# Patient Record
Sex: MALE | Race: BLACK OR AFRICAN AMERICAN | NOT HISPANIC OR LATINO | URBAN - METROPOLITAN AREA
[De-identification: names, ages, dates, MRNs, and addresses within clinical notes are randomized per-mention and may not be internally consistent; named-entity substitution may affect disease eponyms.]

---

## 2020-12-31 ENCOUNTER — WEB ENCOUNTER (OUTPATIENT)
Dept: URBAN - METROPOLITAN AREA CLINIC 115 | Facility: CLINIC | Age: 40
End: 2020-12-31

## 2021-01-07 ENCOUNTER — OFFICE VISIT (OUTPATIENT)
Dept: URBAN - METROPOLITAN AREA CLINIC 82 | Facility: CLINIC | Age: 41
End: 2021-01-07

## 2021-02-04 ENCOUNTER — OFFICE VISIT (OUTPATIENT)
Dept: URBAN - METROPOLITAN AREA CLINIC 82 | Facility: CLINIC | Age: 41
End: 2021-02-04
Payer: COMMERCIAL

## 2021-02-04 DIAGNOSIS — B18.1 CHRONIC VIRAL HEPATITIS B WITHOUT DELTA AGENT AND WITHOUT COMA: ICD-10-CM

## 2021-02-04 PROCEDURE — G8483 FLU IMM NO ADMIN DOC REA: HCPCS | Performed by: INTERNAL MEDICINE

## 2021-02-04 PROCEDURE — G8420 CALC BMI NORM PARAMETERS: HCPCS | Performed by: INTERNAL MEDICINE

## 2021-02-04 PROCEDURE — 99213 OFFICE O/P EST LOW 20 MIN: CPT | Performed by: INTERNAL MEDICINE

## 2021-02-04 RX ORDER — LISINOPRIL 10 MG/1
1 TABLET TABLET ORAL ONCE A DAY
Status: ACTIVE | COMMUNITY

## 2021-02-04 NOTE — HPI-TODAY'S VISIT:
The patient is a 41 yo male following up for hepatitis B.  Diagnosed 4 years ago and had some work-up done including labs and US with Dr. Gitlin.  Has not followed up since 2017.  No previous medications. Currently asymptomatic.  Doing well.  No family hx of hcc.

## 2021-02-10 LAB
A/G RATIO: 2
AFP, SERUM, TUMOR MARKER: 3.4
ALBUMIN: 4.7
ALKALINE PHOSPHATASE: 91
ALT (SGPT): 22
AST (SGOT): 17
BILIRUBIN, TOTAL: <0.2
BUN/CREATININE RATIO: 14
BUN: 16
CALCIUM: 9.9
CARBON DIOXIDE, TOTAL: 29
CHLORIDE: 99
CREATININE: 1.17
EGFR IF AFRICN AM: 90
EGFR IF NONAFRICN AM: 78
GLOBULIN, TOTAL: 2.4
GLUCOSE: 120
HBV LOG10: 1.78
HEPATITIS B QUANTITATION: 60
POTASSIUM: 3.7
PROTEIN, TOTAL: 7.1
SODIUM: 142
TEST INFORMATION:: (no result)

## 2021-02-16 ENCOUNTER — TELEPHONE ENCOUNTER (OUTPATIENT)
Dept: URBAN - METROPOLITAN AREA CLINIC 92 | Facility: CLINIC | Age: 41
End: 2021-02-16

## 2021-03-05 ENCOUNTER — OFFICE VISIT (OUTPATIENT)
Dept: URBAN - METROPOLITAN AREA CLINIC 81 | Facility: CLINIC | Age: 41
End: 2021-03-05

## 2021-08-05 ENCOUNTER — OFFICE VISIT (OUTPATIENT)
Dept: URBAN - METROPOLITAN AREA CLINIC 82 | Facility: CLINIC | Age: 41
End: 2021-08-05
Payer: COMMERCIAL

## 2021-08-05 ENCOUNTER — WEB ENCOUNTER (OUTPATIENT)
Dept: URBAN - METROPOLITAN AREA CLINIC 82 | Facility: CLINIC | Age: 41
End: 2021-08-05

## 2021-08-05 DIAGNOSIS — B18.1 CHRONIC VIRAL HEPATITIS B WITHOUT DELTA AGENT AND WITHOUT COMA: ICD-10-CM

## 2021-08-05 PROCEDURE — 99213 OFFICE O/P EST LOW 20 MIN: CPT | Performed by: INTERNAL MEDICINE

## 2021-08-05 RX ORDER — LISINOPRIL 10 MG/1
1 TABLET TABLET ORAL ONCE A DAY
Status: ACTIVE | COMMUNITY

## 2021-08-05 NOTE — HPI-TODAY'S VISIT:
The patient is a 39 yo male with hepatitis B following up.  Currently doing well and not taking medications.  Previous labs showed low viral load and normal liver enzymes.

## 2022-06-30 ENCOUNTER — OFFICE VISIT (OUTPATIENT)
Dept: URBAN - METROPOLITAN AREA CLINIC 82 | Facility: CLINIC | Age: 42
End: 2022-06-30
Payer: COMMERCIAL

## 2022-06-30 VITALS
DIASTOLIC BLOOD PRESSURE: 81 MMHG | TEMPERATURE: 98.4 F | WEIGHT: 178.2 LBS | SYSTOLIC BLOOD PRESSURE: 121 MMHG | HEIGHT: 66 IN | HEART RATE: 87 BPM | BODY MASS INDEX: 28.64 KG/M2

## 2022-06-30 DIAGNOSIS — B18.1 CHRONIC VIRAL HEPATITIS B WITHOUT DELTA AGENT AND WITHOUT COMA: ICD-10-CM

## 2022-06-30 PROCEDURE — 99213 OFFICE O/P EST LOW 20 MIN: CPT | Performed by: INTERNAL MEDICINE

## 2022-06-30 RX ORDER — LISINOPRIL 10 MG/1
1 TABLET TABLET ORAL ONCE A DAY
Status: ACTIVE | COMMUNITY

## 2022-06-30 NOTE — HPI-TODAY'S VISIT:
The patient is a 41 yo male with hepatitis B following up.  Currently doing well and not taking medications.  Previous labs showed low viral load and normal liver enzymes.

## 2022-07-07 ENCOUNTER — OFFICE VISIT (OUTPATIENT)
Dept: URBAN - METROPOLITAN AREA CLINIC 114 | Facility: CLINIC | Age: 42
End: 2022-07-07
Payer: COMMERCIAL

## 2022-07-07 DIAGNOSIS — B18.1 CHRONIC HEPATITIS B: ICD-10-CM

## 2022-07-07 PROCEDURE — 76705 ECHO EXAM OF ABDOMEN: CPT | Performed by: INTERNAL MEDICINE

## 2022-07-07 RX ORDER — LISINOPRIL 10 MG/1
1 TABLET TABLET ORAL ONCE A DAY
Status: ACTIVE | COMMUNITY

## 2022-07-11 PROBLEM — 61977001 CHRONIC TYPE B VIRAL HEPATITIS: Status: ACTIVE | Noted: 2022-07-11

## 2022-07-12 LAB
A/G RATIO: 2
AFP, SERUM, TUMOR MARKER: 3.1
ALBUMIN: 4.3
ALKALINE PHOSPHATASE: 77
ALT (SGPT): 13
AST (SGOT): 10
BILIRUBIN, TOTAL: 0.2
BUN/CREATININE RATIO: 9
BUN: 10
CALCIUM: 9.3
CARBON DIOXIDE, TOTAL: 23
CHLORIDE: 106
CREATININE: 1.17
EGFR: 80
GLOBULIN, TOTAL: 2.2
GLUCOSE: 85
HBV LOG10: 1.78
HEPATITIS B QUANTITATION: 60
POTASSIUM: 4.1
PROTEIN, TOTAL: 6.5
SODIUM: 140
TEST INFORMATION:: (no result)

## 2022-07-13 ENCOUNTER — TELEPHONE ENCOUNTER (OUTPATIENT)
Dept: URBAN - METROPOLITAN AREA CLINIC 92 | Facility: CLINIC | Age: 42
End: 2022-07-13

## 2022-07-15 ENCOUNTER — TELEPHONE ENCOUNTER (OUTPATIENT)
Dept: URBAN - METROPOLITAN AREA CLINIC 92 | Facility: CLINIC | Age: 42
End: 2022-07-15

## 2022-12-29 ENCOUNTER — OFFICE VISIT (OUTPATIENT)
Dept: URBAN - METROPOLITAN AREA CLINIC 82 | Facility: CLINIC | Age: 42
End: 2022-12-29

## 2023-03-02 ENCOUNTER — OFFICE VISIT (OUTPATIENT)
Dept: URBAN - METROPOLITAN AREA CLINIC 82 | Facility: CLINIC | Age: 43
End: 2023-03-02
Payer: COMMERCIAL

## 2023-03-02 VITALS
TEMPERATURE: 98.9 F | BODY MASS INDEX: 28.87 KG/M2 | HEIGHT: 66 IN | RESPIRATION RATE: 16 BRPM | HEART RATE: 65 BPM | SYSTOLIC BLOOD PRESSURE: 134 MMHG | DIASTOLIC BLOOD PRESSURE: 82 MMHG | WEIGHT: 179.6 LBS

## 2023-03-02 DIAGNOSIS — B18.1 CHRONIC VIRAL HEPATITIS B WITHOUT DELTA AGENT AND WITHOUT COMA: ICD-10-CM

## 2023-03-02 PROBLEM — 61977001: Status: ACTIVE | Noted: 2021-02-04

## 2023-03-02 PROCEDURE — 99213 OFFICE O/P EST LOW 20 MIN: CPT | Performed by: INTERNAL MEDICINE

## 2023-03-02 RX ORDER — LISINOPRIL 10 MG/1
1 TABLET TABLET ORAL ONCE A DAY
Status: ACTIVE | COMMUNITY

## 2023-03-06 LAB
A/G RATIO: 1.7
AFP, SERUM, TUMOR MARKER: 3.4
ALBUMIN: 4.4
ALKALINE PHOSPHATASE: 69
ALT (SGPT): 14
AST (SGOT): 12
BILIRUBIN, TOTAL: 0.4
BUN/CREATININE RATIO: (no result)
BUN: 17
CALCIUM: 10.2
CARBON DIOXIDE, TOTAL: 29
CHLORIDE: 102
CREATININE: 1.1
EGFR: 86
GLOBULIN, TOTAL: 2.6
GLUCOSE: 91
HEPATITIS B VIRUS DNA: 1.94
HEPATITIS B VIRUS DNA: 87
POTASSIUM: 3.9
PROTEIN, TOTAL: 7
SODIUM: 140

## 2023-03-07 ENCOUNTER — TELEPHONE ENCOUNTER (OUTPATIENT)
Dept: URBAN - METROPOLITAN AREA CLINIC 92 | Facility: CLINIC | Age: 43
End: 2023-03-07

## 2023-09-07 ENCOUNTER — OFFICE VISIT (OUTPATIENT)
Dept: URBAN - METROPOLITAN AREA CLINIC 82 | Facility: CLINIC | Age: 43
End: 2023-09-07
Payer: COMMERCIAL

## 2023-09-07 VITALS
WEIGHT: 180 LBS | DIASTOLIC BLOOD PRESSURE: 86 MMHG | BODY MASS INDEX: 28.93 KG/M2 | HEART RATE: 89 BPM | RESPIRATION RATE: 20 BRPM | HEIGHT: 66 IN | SYSTOLIC BLOOD PRESSURE: 143 MMHG | TEMPERATURE: 98.2 F

## 2023-09-07 DIAGNOSIS — B18.1 CHRONIC VIRAL HEPATITIS B WITHOUT DELTA AGENT AND WITHOUT COMA: ICD-10-CM

## 2023-09-07 PROCEDURE — 99213 OFFICE O/P EST LOW 20 MIN: CPT | Performed by: INTERNAL MEDICINE

## 2023-09-07 RX ORDER — LISINOPRIL 10 MG/1
1 TABLET TABLET ORAL ONCE A DAY
Status: ACTIVE | COMMUNITY

## 2023-09-09 LAB
A/G RATIO: 1.7
AFP, SERUM, TUMOR MARKER: 3.3
ALBUMIN: 4.3
ALKALINE PHOSPHATASE: 69
ALT (SGPT): 14
AST (SGOT): 15
BILIRUBIN, TOTAL: 0.4
BUN/CREATININE RATIO: (no result)
BUN: 10
CALCIUM: 9.8
CARBON DIOXIDE, TOTAL: 27
CHLORIDE: 106
CREATININE: 1.15
EGFR: 81
GLOBULIN, TOTAL: 2.5
GLUCOSE: 139
HBV LOG10: 1.79
HEPATITIS B VIRUS DNA: 1.79
HEPATITIS B VIRUS DNA: 61
POTASSIUM: 3.9
PROTEIN, TOTAL: 6.8
SODIUM: 139

## 2023-09-11 ENCOUNTER — TELEPHONE ENCOUNTER (OUTPATIENT)
Dept: URBAN - METROPOLITAN AREA CLINIC 42 | Facility: CLINIC | Age: 43
End: 2023-09-11

## 2023-10-13 ENCOUNTER — OFFICE VISIT (OUTPATIENT)
Dept: URBAN - METROPOLITAN AREA CLINIC 81 | Facility: CLINIC | Age: 43
End: 2023-10-13

## 2023-12-04 ENCOUNTER — OFFICE VISIT (OUTPATIENT)
Dept: URBAN - METROPOLITAN AREA CLINIC 114 | Facility: CLINIC | Age: 43
End: 2023-12-04

## 2023-12-07 ENCOUNTER — OFFICE VISIT (OUTPATIENT)
Dept: URBAN - METROPOLITAN AREA CLINIC 114 | Facility: CLINIC | Age: 43
End: 2023-12-07
Payer: COMMERCIAL

## 2023-12-07 DIAGNOSIS — B19.10 HEPATITIS B: ICD-10-CM

## 2023-12-07 PROCEDURE — 76705 ECHO EXAM OF ABDOMEN: CPT | Performed by: INTERNAL MEDICINE

## 2023-12-07 RX ORDER — LISINOPRIL 10 MG/1
1 TABLET TABLET ORAL ONCE A DAY
Status: ACTIVE | COMMUNITY

## 2023-12-08 ENCOUNTER — TELEPHONE ENCOUNTER (OUTPATIENT)
Dept: URBAN - METROPOLITAN AREA CLINIC 42 | Facility: CLINIC | Age: 43
End: 2023-12-08

## 2024-01-17 ENCOUNTER — TELEPHONE ENCOUNTER (OUTPATIENT)
Dept: URBAN - METROPOLITAN AREA CLINIC 115 | Facility: CLINIC | Age: 44
End: 2024-01-17

## 2024-04-11 ENCOUNTER — OV EP (OUTPATIENT)
Dept: URBAN - METROPOLITAN AREA CLINIC 82 | Facility: CLINIC | Age: 44
End: 2024-04-11
Payer: COMMERCIAL

## 2024-04-11 VITALS
WEIGHT: 177 LBS | HEART RATE: 86 BPM | TEMPERATURE: 98.2 F | RESPIRATION RATE: 20 BRPM | BODY MASS INDEX: 28.45 KG/M2 | HEIGHT: 66 IN | SYSTOLIC BLOOD PRESSURE: 146 MMHG | DIASTOLIC BLOOD PRESSURE: 104 MMHG

## 2024-04-11 DIAGNOSIS — B18.1 CHRONIC VIRAL HEPATITIS B WITHOUT DELTA AGENT AND WITHOUT COMA: ICD-10-CM

## 2024-04-11 PROCEDURE — 99213 OFFICE O/P EST LOW 20 MIN: CPT | Performed by: INTERNAL MEDICINE

## 2024-04-11 RX ORDER — LISINOPRIL 10 MG/1
1 TABLET TABLET ORAL ONCE A DAY
Status: ACTIVE | COMMUNITY

## 2024-04-11 NOTE — HPI-TODAY'S VISIT:
The patient is a 42 yo male with hepatitis B following up.  Currently doing well and not taking medications.  Previous labs showed low viral load and normal liver enzymes.

## 2024-04-15 LAB
A/G RATIO: 1.8
AFP, SERUM, TUMOR MARKER: 3.3
ALBUMIN: 4.4
ALKALINE PHOSPHATASE: 84
ALT (SGPT): 17
AST (SGOT): 13
BILIRUBIN, TOTAL: 0.4
BUN/CREATININE RATIO: 9
BUN: 12
CALCIUM: 10
CARBON DIOXIDE, TOTAL: 27
CHLORIDE: 106
CREATININE: 1.34
EGFR: 67
GLOBULIN, TOTAL: 2.4
GLUCOSE: 96
HBV LOG10: 1.59
HEPATITIS B VIRUS DNA: 39
POTASSIUM: 4.5
PROTEIN, TOTAL: 6.8
SODIUM: 144

## 2024-10-09 ENCOUNTER — DASHBOARD ENCOUNTERS (OUTPATIENT)
Age: 44
End: 2024-10-09

## 2024-10-10 ENCOUNTER — OFFICE VISIT (OUTPATIENT)
Dept: URBAN - METROPOLITAN AREA CLINIC 82 | Facility: CLINIC | Age: 44
End: 2024-10-10
Payer: COMMERCIAL

## 2024-10-10 VITALS
WEIGHT: 180 LBS | TEMPERATURE: 97.9 F | SYSTOLIC BLOOD PRESSURE: 132 MMHG | RESPIRATION RATE: 20 BRPM | HEART RATE: 69 BPM | DIASTOLIC BLOOD PRESSURE: 91 MMHG | HEIGHT: 66 IN | BODY MASS INDEX: 28.93 KG/M2

## 2024-10-10 DIAGNOSIS — B18.1 CHRONIC VIRAL HEPATITIS B WITHOUT DELTA AGENT AND WITHOUT COMA: ICD-10-CM

## 2024-10-10 PROCEDURE — 99213 OFFICE O/P EST LOW 20 MIN: CPT | Performed by: INTERNAL MEDICINE

## 2024-10-10 RX ORDER — LISINOPRIL 10 MG/1
1 TABLET TABLET ORAL ONCE A DAY
Status: ACTIVE | COMMUNITY

## 2024-10-10 NOTE — HPI-TODAY'S VISIT:
The patient is a 43 yo male with hepatitis B following up.  Currently doing well and not taking medications.  Previous labs showed low viral load and normal liver enzymes.

## 2024-10-13 LAB
A/G RATIO: 1.9
AFP, SERUM, TUMOR MARKER: 3.4
ALBUMIN: 4.2
ALKALINE PHOSPHATASE: 88
ALT (SGPT): 32
AST (SGOT): 17
BILIRUBIN, TOTAL: 0.4
BUN/CREATININE RATIO: 8
BUN: 11
CALCIUM: 9.4
CARBON DIOXIDE, TOTAL: 28
CHLORIDE: 106
CREATININE: 1.32
EGFR: 68
GLOBULIN, TOTAL: 2.2
GLUCOSE: 88
HBV LOG10: (no result)
HEPATITIS B VIRUS DNA: (no result)
POTASSIUM: 4.5
PROTEIN, TOTAL: 6.4
SODIUM: 141

## 2024-10-16 ENCOUNTER — TELEPHONE ENCOUNTER (OUTPATIENT)
Dept: URBAN - METROPOLITAN AREA CLINIC 42 | Facility: CLINIC | Age: 44
End: 2024-10-16

## 2024-10-25 ENCOUNTER — OFFICE VISIT (OUTPATIENT)
Dept: URBAN - METROPOLITAN AREA CLINIC 81 | Facility: CLINIC | Age: 44
End: 2024-10-25
Payer: COMMERCIAL

## 2024-10-25 DIAGNOSIS — K82.4 GALL BLADDER POLYP: ICD-10-CM

## 2024-10-25 PROCEDURE — 76705 ECHO EXAM OF ABDOMEN: CPT | Performed by: INTERNAL MEDICINE

## 2024-11-26 ENCOUNTER — TELEPHONE ENCOUNTER (OUTPATIENT)
Dept: URBAN - METROPOLITAN AREA CLINIC 42 | Facility: CLINIC | Age: 44
End: 2024-11-26

## 2025-04-10 ENCOUNTER — OFFICE VISIT (OUTPATIENT)
Dept: URBAN - METROPOLITAN AREA CLINIC 82 | Facility: CLINIC | Age: 45
End: 2025-04-10

## 2025-05-19 ENCOUNTER — OFFICE VISIT (OUTPATIENT)
Dept: URBAN - METROPOLITAN AREA CLINIC 82 | Facility: CLINIC | Age: 45
End: 2025-05-19
Payer: COMMERCIAL

## 2025-05-19 DIAGNOSIS — B18.1 CHRONIC VIRAL HEPATITIS B WITHOUT DELTA AGENT AND WITHOUT COMA: ICD-10-CM

## 2025-05-19 PROCEDURE — 99213 OFFICE O/P EST LOW 20 MIN: CPT | Performed by: INTERNAL MEDICINE

## 2025-05-19 RX ORDER — LISINOPRIL 10 MG/1
1 TABLET TABLET ORAL ONCE A DAY
Status: ACTIVE | COMMUNITY

## 2025-05-19 NOTE — HPI-TODAY'S VISIT:
The patient is a 45 yo male with hepatitis B following up.  Currently doing well and not taking medications.  Previous labs showed low viral load and normal liver enzymes.

## 2025-05-19 NOTE — PHYSICAL EXAM CONSTITUTIONAL:
well developed, well nourished , in no acute distress , ambulating without difficulty , normal communication ability
29-Dec-2023 11:57

## 2025-05-22 LAB
A/G RATIO: 2
AFP, SERUM, TUMOR MARKER: 3.1
ALBUMIN: 4.3
ALKALINE PHOSPHATASE: 103
ALT (SGPT): 21
AST (SGOT): 15
BILIRUBIN, TOTAL: 0.4
BUN/CREATININE RATIO: (no result)
BUN: 13
CALCIUM: 9.5
CARBON DIOXIDE, TOTAL: 27
CHLORIDE: 104
CREATININE: 1.17
EGFR: 79
GLOBULIN, TOTAL: 2.1
GLUCOSE: 115
HEPATITIS B VIRUS DNA: NOT DETECTED
HEPATITIS B VIRUS DNA: NOT DETECTED
POTASSIUM: 3.7
PROTEIN, TOTAL: 6.4
SODIUM: 139

## 2025-05-23 ENCOUNTER — TELEPHONE ENCOUNTER (OUTPATIENT)
Dept: URBAN - METROPOLITAN AREA CLINIC 42 | Facility: CLINIC | Age: 45
End: 2025-05-23